# Patient Record
Sex: FEMALE | Race: WHITE | NOT HISPANIC OR LATINO
[De-identification: names, ages, dates, MRNs, and addresses within clinical notes are randomized per-mention and may not be internally consistent; named-entity substitution may affect disease eponyms.]

---

## 2017-09-12 ENCOUNTER — MESSAGE (OUTPATIENT)
Age: 57
End: 2017-09-12

## 2017-09-12 PROBLEM — Z00.00 ENCOUNTER FOR PREVENTIVE HEALTH EXAMINATION: Status: ACTIVE | Noted: 2017-09-12

## 2017-10-19 ENCOUNTER — NON-APPOINTMENT (OUTPATIENT)
Age: 57
End: 2017-10-19

## 2017-10-19 ENCOUNTER — APPOINTMENT (OUTPATIENT)
Dept: GASTROENTEROLOGY | Facility: CLINIC | Age: 57
End: 2017-10-19
Payer: COMMERCIAL

## 2017-10-19 VITALS
WEIGHT: 191 LBS | TEMPERATURE: 98.5 F | SYSTOLIC BLOOD PRESSURE: 126 MMHG | HEIGHT: 64 IN | DIASTOLIC BLOOD PRESSURE: 78 MMHG | BODY MASS INDEX: 32.61 KG/M2

## 2017-10-19 DIAGNOSIS — K44.9 DIAPHRAGMATIC HERNIA W/OUT OBSTRUCTION OR GANGRENE: ICD-10-CM

## 2017-10-19 DIAGNOSIS — Z87.891 PERSONAL HISTORY OF NICOTINE DEPENDENCE: ICD-10-CM

## 2017-10-19 DIAGNOSIS — E72.12 METHYLENETETRAHYDROFOLATE REDUCTASE DEFICIENCY: ICD-10-CM

## 2017-10-19 DIAGNOSIS — Z80.3 FAMILY HISTORY OF MALIGNANT NEOPLASM OF BREAST: ICD-10-CM

## 2017-10-19 DIAGNOSIS — Z80.0 FAMILY HISTORY OF MALIGNANT NEOPLASM OF DIGESTIVE ORGANS: ICD-10-CM

## 2017-10-19 DIAGNOSIS — Z86.018 PERSONAL HISTORY OF OTHER BENIGN NEOPLASM: ICD-10-CM

## 2017-10-19 DIAGNOSIS — Z86.2 PERSONAL HISTORY OF DISEASES OF THE BLOOD AND BLOOD-FORMING ORGANS AND CERTAIN DISORDERS INVOLVING THE IMMUNE MECHANISM: ICD-10-CM

## 2017-10-19 DIAGNOSIS — R93.5 ABNORMAL FINDINGS ON DIAGNOSTIC IMAGING OF OTHER ABDOMINAL REGIONS, INCLUDING RETROPERITONEUM: ICD-10-CM

## 2017-10-19 PROCEDURE — 93000 ELECTROCARDIOGRAM COMPLETE: CPT

## 2017-10-19 PROCEDURE — 99215 OFFICE O/P EST HI 40 MIN: CPT | Mod: 25

## 2017-10-19 RX ORDER — ASPIRIN 81 MG
81 TABLET, DELAYED RELEASE (ENTERIC COATED) ORAL
Refills: 0 | Status: ACTIVE | COMMUNITY

## 2017-10-23 LAB
APPEARANCE: CLEAR
BACTERIA UR CULT: NORMAL
BILIRUBIN URINE: NEGATIVE
BLOOD URINE: NEGATIVE
COLOR: YELLOW
GLUCOSE QUALITATIVE U: NEGATIVE MG/DL
KETONES URINE: NEGATIVE
LEUKOCYTE ESTERASE URINE: NEGATIVE
NITRITE URINE: NEGATIVE
PH URINE: 7.5
PROTEIN URINE: NEGATIVE MG/DL
SPECIFIC GRAVITY URINE: 1.02
UROBILINOGEN URINE: NEGATIVE MG/DL

## 2017-11-07 ENCOUNTER — APPOINTMENT (OUTPATIENT)
Dept: GASTROENTEROLOGY | Facility: CLINIC | Age: 57
End: 2017-11-07
Payer: COMMERCIAL

## 2017-11-07 VITALS
WEIGHT: 192 LBS | DIASTOLIC BLOOD PRESSURE: 80 MMHG | TEMPERATURE: 98.1 F | SYSTOLIC BLOOD PRESSURE: 124 MMHG | HEIGHT: 64 IN | BODY MASS INDEX: 32.78 KG/M2

## 2017-11-07 PROCEDURE — 99213 OFFICE O/P EST LOW 20 MIN: CPT

## 2017-11-30 ENCOUNTER — APPOINTMENT (OUTPATIENT)
Dept: GASTROENTEROLOGY | Facility: CLINIC | Age: 57
End: 2017-11-30
Payer: COMMERCIAL

## 2017-11-30 VITALS
HEIGHT: 64 IN | TEMPERATURE: 97.9 F | WEIGHT: 192 LBS | SYSTOLIC BLOOD PRESSURE: 118 MMHG | DIASTOLIC BLOOD PRESSURE: 72 MMHG | BODY MASS INDEX: 32.78 KG/M2

## 2017-11-30 PROCEDURE — 36415 COLL VENOUS BLD VENIPUNCTURE: CPT

## 2017-11-30 PROCEDURE — 99213 OFFICE O/P EST LOW 20 MIN: CPT | Mod: 25

## 2017-11-30 RX ORDER — GABAPENTIN 100 MG/1
100 CAPSULE ORAL
Refills: 0 | Status: DISCONTINUED | COMMUNITY
End: 2017-11-30

## 2017-11-30 RX ORDER — GENTAMICIN SULFATE 1 MG/G
0.1 CREAM TOPICAL
Qty: 30 | Refills: 0 | Status: DISCONTINUED | COMMUNITY
Start: 2017-05-05 | End: 2017-11-30

## 2017-12-04 LAB
ALBUMIN SERPL ELPH-MCNC: 4.1 G/DL
ALP BLD-CCNC: 89 U/L
ALT SERPL-CCNC: 29 U/L
ANION GAP SERPL CALC-SCNC: 17 MMOL/L
AST SERPL-CCNC: 30 U/L
BASOPHILS # BLD AUTO: 0.02 K/UL
BASOPHILS NFR BLD AUTO: 0.2 %
BILIRUB SERPL-MCNC: 0.4 MG/DL
BUN SERPL-MCNC: 15 MG/DL
CALCIUM SERPL-MCNC: 9.9 MG/DL
CHLORIDE SERPL-SCNC: 99 MMOL/L
CO2 SERPL-SCNC: 26 MMOL/L
CREAT SERPL-MCNC: 1.02 MG/DL
EOSINOPHIL # BLD AUTO: 0.2 K/UL
EOSINOPHIL NFR BLD AUTO: 2.3 %
GLIADIN IGA SER QL: 5.3 UNITS
GLIADIN IGG SER QL: 7.6 UNITS
GLIADIN PEPTIDE IGA SER-ACNC: NEGATIVE
GLIADIN PEPTIDE IGG SER-ACNC: NEGATIVE
GLUCOSE SERPL-MCNC: 115 MG/DL
HCT VFR BLD CALC: 39.9 %
HGB BLD-MCNC: 13.2 G/DL
IGA SER QL IEP: 259 MG/DL
IMM GRANULOCYTES NFR BLD AUTO: 0.1 %
LYMPHOCYTES # BLD AUTO: 3.13 K/UL
LYMPHOCYTES NFR BLD AUTO: 36.1 %
MAN DIFF?: NORMAL
MCHC RBC-ENTMCNC: 29.6 PG
MCHC RBC-ENTMCNC: 33.1 GM/DL
MCV RBC AUTO: 89.5 FL
MONOCYTES # BLD AUTO: 0.28 K/UL
MONOCYTES NFR BLD AUTO: 3.2 %
NEUTROPHILS # BLD AUTO: 5.04 K/UL
NEUTROPHILS NFR BLD AUTO: 58.1 %
PLATELET # BLD AUTO: 270 K/UL
POTASSIUM SERPL-SCNC: 4.2 MMOL/L
PROT SERPL-MCNC: 8.3 G/DL
RBC # BLD: 4.46 M/UL
RBC # FLD: 13.8 %
SODIUM SERPL-SCNC: 142 MMOL/L
TSH SERPL-ACNC: 1.25 UIU/ML
TTG IGA SER IA-ACNC: <5 UNITS
TTG IGA SER-ACNC: NEGATIVE
TTG IGG SER IA-ACNC: <5 UNITS
TTG IGG SER IA-ACNC: NEGATIVE
WBC # FLD AUTO: 8.68 K/UL

## 2017-12-13 ENCOUNTER — APPOINTMENT (OUTPATIENT)
Dept: GASTROENTEROLOGY | Facility: CLINIC | Age: 57
End: 2017-12-13
Payer: COMMERCIAL

## 2017-12-13 VITALS
HEIGHT: 64 IN | SYSTOLIC BLOOD PRESSURE: 140 MMHG | BODY MASS INDEX: 32.27 KG/M2 | TEMPERATURE: 99 F | WEIGHT: 189 LBS | DIASTOLIC BLOOD PRESSURE: 80 MMHG

## 2017-12-13 PROCEDURE — 99213 OFFICE O/P EST LOW 20 MIN: CPT | Mod: 25

## 2017-12-13 PROCEDURE — 82270 OCCULT BLOOD FECES: CPT

## 2017-12-13 RX ORDER — OMEPRAZOLE 20 MG/1
20 CAPSULE, DELAYED RELEASE ORAL DAILY
Qty: 90 | Refills: 0 | Status: DISCONTINUED | COMMUNITY
Start: 2017-09-29 | End: 2017-12-13

## 2017-12-13 RX ORDER — LORATADINE 5 MG
17 TABLET,CHEWABLE ORAL
Refills: 0 | Status: DISCONTINUED | COMMUNITY
End: 2017-12-13

## 2018-01-02 ENCOUNTER — TRANSCRIPTION ENCOUNTER (OUTPATIENT)
Age: 58
End: 2018-01-02

## 2018-01-05 ENCOUNTER — APPOINTMENT (OUTPATIENT)
Dept: GASTROENTEROLOGY | Facility: CLINIC | Age: 58
End: 2018-01-05
Payer: COMMERCIAL

## 2018-01-05 VITALS
HEIGHT: 64 IN | BODY MASS INDEX: 32.44 KG/M2 | TEMPERATURE: 97.7 F | SYSTOLIC BLOOD PRESSURE: 128 MMHG | WEIGHT: 190 LBS | DIASTOLIC BLOOD PRESSURE: 84 MMHG

## 2018-01-05 PROCEDURE — 99213 OFFICE O/P EST LOW 20 MIN: CPT

## 2018-01-05 RX ORDER — CHLORHEXIDINE GLUCONATE 4 %
400 LIQUID (ML) TOPICAL
Refills: 0 | Status: DISCONTINUED | COMMUNITY
End: 2018-01-05

## 2018-01-05 RX ORDER — LYSINE HCL 500 MG
TABLET ORAL
Refills: 0 | Status: DISCONTINUED | COMMUNITY
End: 2018-01-05

## 2018-01-05 RX ORDER — SACCHAROMYCES BOULARDII 50 MG
CAPSULE ORAL
Refills: 0 | Status: DISCONTINUED | COMMUNITY
End: 2018-01-05

## 2018-01-22 ENCOUNTER — APPOINTMENT (OUTPATIENT)
Dept: GASTROENTEROLOGY | Facility: CLINIC | Age: 58
End: 2018-01-22
Payer: COMMERCIAL

## 2018-01-22 VITALS
HEIGHT: 64 IN | TEMPERATURE: 98.3 F | SYSTOLIC BLOOD PRESSURE: 140 MMHG | BODY MASS INDEX: 31.41 KG/M2 | WEIGHT: 184 LBS | DIASTOLIC BLOOD PRESSURE: 80 MMHG

## 2018-01-22 PROCEDURE — 99213 OFFICE O/P EST LOW 20 MIN: CPT

## 2018-01-22 RX ORDER — LUBIPROSTONE 24 UG/1
24 CAPSULE, GELATIN COATED ORAL
Refills: 0 | Status: DISCONTINUED | COMMUNITY
End: 2018-01-22

## 2018-02-14 ENCOUNTER — APPOINTMENT (OUTPATIENT)
Dept: GASTROENTEROLOGY | Facility: CLINIC | Age: 58
End: 2018-02-14
Payer: COMMERCIAL

## 2018-02-14 VITALS
WEIGHT: 183 LBS | BODY MASS INDEX: 31.24 KG/M2 | DIASTOLIC BLOOD PRESSURE: 80 MMHG | SYSTOLIC BLOOD PRESSURE: 110 MMHG | TEMPERATURE: 98.8 F | HEIGHT: 64 IN

## 2018-02-14 DIAGNOSIS — K21.9 GASTRO-ESOPHAGEAL REFLUX DISEASE W/OUT ESOPHAGITIS: ICD-10-CM

## 2018-02-14 DIAGNOSIS — F45.8 OTHER SOMATOFORM DISORDERS: ICD-10-CM

## 2018-02-14 DIAGNOSIS — R10.32 LEFT LOWER QUADRANT PAIN: ICD-10-CM

## 2018-02-14 PROCEDURE — 99214 OFFICE O/P EST MOD 30 MIN: CPT

## 2018-02-14 RX ORDER — CHOLECALCIFEROL (VITAMIN D3) 25 MCG
TABLET ORAL
Refills: 0 | Status: DISCONTINUED | COMMUNITY
End: 2018-02-14

## 2018-02-14 RX ORDER — LINACLOTIDE 290 UG/1
290 CAPSULE, GELATIN COATED ORAL
Refills: 0 | Status: DISCONTINUED | COMMUNITY
End: 2018-02-14

## 2018-02-14 RX ORDER — BACILLUS COAGULANS/INULIN 1B-250 MG
CAPSULE ORAL
Refills: 0 | Status: DISCONTINUED | COMMUNITY
End: 2018-02-14

## 2019-05-10 ENCOUNTER — APPOINTMENT (OUTPATIENT)
Dept: GASTROENTEROLOGY | Facility: CLINIC | Age: 59
End: 2019-05-10
Payer: COMMERCIAL

## 2019-05-10 VITALS
OXYGEN SATURATION: 98 % | TEMPERATURE: 98.3 F | HEIGHT: 64 IN | HEART RATE: 67 BPM | WEIGHT: 177 LBS | SYSTOLIC BLOOD PRESSURE: 110 MMHG | BODY MASS INDEX: 30.22 KG/M2 | DIASTOLIC BLOOD PRESSURE: 78 MMHG

## 2019-05-10 PROCEDURE — 99214 OFFICE O/P EST MOD 30 MIN: CPT | Mod: 25

## 2019-05-10 PROCEDURE — 82270 OCCULT BLOOD FECES: CPT

## 2019-05-10 RX ORDER — OMEPRAZOLE 20 MG/1
20 CAPSULE, DELAYED RELEASE ORAL
Qty: 90 | Refills: 0 | Status: DISCONTINUED | COMMUNITY
Start: 2018-02-14 | End: 2019-05-10

## 2019-05-10 RX ORDER — HYOSCYAMINE SULFATE 0.12 MG/1
0.12 TABLET SUBLINGUAL 4 TIMES DAILY
Qty: 50 | Refills: 0 | Status: DISCONTINUED | COMMUNITY
Start: 2018-01-22 | End: 2019-05-10

## 2019-05-10 RX ORDER — LACTOBACILLUS ACIDOPHILUS/PECT 30 MG-20MG
TABLET ORAL
Refills: 0 | Status: ACTIVE | COMMUNITY

## 2019-05-10 RX ORDER — CHOLECALCIFEROL (VITAMIN D3) 25 MCG
TABLET ORAL
Refills: 0 | Status: ACTIVE | COMMUNITY

## 2019-05-10 RX ORDER — HYDROCORTISONE ACETATE 25 MG/1
25 SUPPOSITORY RECTAL
Qty: 20 | Refills: 1 | Status: ACTIVE | COMMUNITY
Start: 2019-05-10 | End: 1900-01-01

## 2019-05-11 NOTE — ASSESSMENT
[FreeTextEntry1] : Patient with complaints of rectal pressure along with pain, burning, and itching leading to a sensation that her bowel movements are incomplete. There are no masses or external hemorrhoids on rectal examination stool is guaiac negative. It is possible that the patient has symptomatic internal hemorrhoids.\par \par We will empirically treat with hydrocortisone suppositories 25 mg twice a day for 10 days.\par \par The patient will call me after these 10 days to update me on her symptoms. If there is no improvement, I will then send the patient to colorectal surgeon for further evaluation.\par \par Patient is due for colonoscopy in February 2020.\par \par \par Plan from 2/14/18 - Patient's bowel movements a doing much better without the use of any medication. She is changed her diet and is exercising. The patient does complain of belching and a globus sensation related to her known reflux.\par \par Patient was advised to continue a high fiber diet.\par \par Patient was advised to use omeprazole 20 mg a day as needed.\par \par A list of dietary and lifestyle modifications in the treatment of GERD was given to the patient.\par \par \par Plan from 1/22/18 - Patient with improved bowel movements on Linzess but still with intermittent left lower quadrant pain. The patient has a rash and there is some concern that this could be an allergic reaction to the Linzess. Patient also states that her reflux symptoms have been worse since beginning the medication.\par \par Patient was advised to stop Linzess and returned to using MiraLax 17 g a day.\par \par The patient was given hyoscyamine SL2 use p.r.n.\par \par Patient was advised she must see a dermatologist regarding the rash.\par \par Patient was advised to continue diet and weight loss for her acid reflux.\par \par \par Plan from 1/5/18 - Patient doing somewhat better on Amitiza 24 mcg b.i.d. with improvement with her bowel movements. She continues to have left lower quadrant pain.\par \par I have changed the patient to Linzess 290 mcg q.d. to see if this provides better symptom response.\par \par Patient will return to see me in 2-3 weeks.\par \par \par Plan from 12/13/17 - Patient with continued complaints of constipation and incomplete bowel movements. She had a CT scan which showed a significant amount of stool extending into the small intestine. She has soft stool present in the rectal vault on rectal exam. She denies any reflux symptoms and was able to come off the omeprazole.\par \par I've increased Amitiza 20 mcg b.i.d. Samples were given.\par \par A list of dietary and lifestyle modifications in the treatment of GERD was given to the patient.\par \par Patient will return to see me in 3-4 weeks to assess her response.\par \par \par Note from 11/30/17 - Patient with ongoing feeling of constipation despite being on MiraLax daily. She also has left lower quadrant pain. CT scans have not shown any evidence of diverticulitis. The patient self administered a course of Cipro last week without improvement. Her reflux symptoms are doing very well despite decreasing her dosage of omeprazole significantly.\par \par As the symptoms are most consistent with IBS-C, I have given the patient samples of Amitiza 8 mcg b.i.d. to take instead of the MiraLax.\par \par Bloodwork was sent for CBC, chem-pack, TSH, celiac markers. \par \par Patient will try stopping omeprazole altogether.\par \par Patient will return to see me in 2 weeks to assess her response to the above.\par \par \par Plan from 11/7/17 - Patient doing well on daily MiraLax with resolution of abdominal pain and regular bowel movements. She has no symptoms of reflux on omeprazole.\par \par Patient will continue MiraLax daily.\par \par A list of dietary and lifestyle modifications in the treatment of GERD was given to the patient.\par \par We will try decreasing omeprazole to 20 mg every other day.\par \par \par Plan from 10/19/17 - Patient went to the ER with left lower quadrant pain and was found to have a significant amount  of stool in both the small bowel and colon. CT also showed moderate thickening versus undistended distention of the antrum. She feels better after taking magnesium citrate. She had colonoscopy and EGD earlier this year.\par \par Patient was advised to start MiraLax 17 g a day.\par \par UA and C&S were sent. Patient was also advised to followup with her urologist Dr. Salazar regarding the CT findings.\par \par EKG was done for the irregular heart beat. Frequent PACs were noted. She was advised to followup with Dr. Sheth regarding this.\par \par Patient will return to see me in 2 weeks.

## 2019-05-11 NOTE — HISTORY OF PRESENT ILLNESS
[FreeTextEntry1] : The patient has a history of colonic polyps. Recently, she has a sensation of constipation with incomplete bowel movements she is moving her bowels one to 2 times a day. She has been taking MiraLax daily and Colace for greater than a month but still feels rectal pressure leading to a sensation of incomplete bowel movements. She also notes burning and itching in her rectum as well. She denies melena or prior blood per rectum. She denies abdominal pain. She has lost 18 pounds over the past 4 months intentionally. The patient has not been hospitalized in the past year and denies any cardiac issues.\par \par \par Note from 2/14/18 - The patient stopped Linzess and never went back on MiraLax. She has changed her diet and is eating more food more fruits and vegetables as well as exercising. She reports 1-2 solid bowel movements a day without melena or bright red blood per rectum. She notes much less abdominal pain. She tried hyoscyamine 2-3 times and did not notice any response. She does report that she is belching and also has a globus sensation in her throat. She has occasional mild heartburn. She denies dysphagia. The patient started taking apple side of the negative.\par \par \par Note from 1/22/18 - The patient has been on Linzess 290 mcg a day for the past 2 weeks. She is having one bowel movement a day which is soft without melena or prior blood per rectum. She continues to have left lower quadrant pain one to 2 times a week that last a few hours at a time and then resolves. Of concern, the patient has a macular rash on her face, chest, and back along with some pruritus. It is unclear whether or not this was present before the Linzess was started. The patient also states that her heartburn has returned after being placed on the Linzess. The patient has lost 6 pounds intentionally.\par \par \par Note from 1/5/18 - The patient has been on Amitiza 24 mcg b.i.d. Her bowel movements are better with 1-2 solid bowel movements a day. She continues to have left lower quadrant pain occurring 2-3 times a week. She also notes increased belching.\par \par \par Note from 12/13/17 - The patient has been on Amitiza 8 mcg b.i.d. for the past 10-12 days. She continues to complain of constipation with incomplete bowel movements. She goes mostly daily although occasionally skips a day. She has occasional left lower quadrant pain/spasm. The patient has stopped her omeprazole completely and denies heartburn, dysphagia, or abdominal pain. We reviewed her blood work from the previous visit on November 30. These results were normal.\par \par \par Note from 11/30/17 - The patient has been on MiraLax daily and has been able to decrease her omeprazole to 20 mg every 3-4 days. Despite moving her bowels once a day with a solid bowel movement, she continues to feel like she is full all and also has started to have left lower quadrant pain again. She took 7 days of Cipro 500 mg b.i.d. on her home last week but this did not help the pain. She also complains of chills but does not have fever, nausea, or vomiting. She denies melena or bright red blood per rectum. She also denies any heartburn or dysphagia on the infrequent dosing of omeprazole. Her weight is stable.\par \par \par Note from 11/7/17 - The patient has been on MiraLax q.d. and is having one to 2 solid bowel movements a day. She skipped a day on 2 occasions. Her left lower quadrant pain is resolved. Her UA and C&S was negative. She denies heartburn or dysphagia. Of note, the patient was just started on Neurontin.\par \par \par Note from 10/19/17 - The patient went to be [facility] emergency room on October 15, 2017 with left lower quadrant pain which had been going on for weeks. A CT scan showed fecalization of some small bowel loops and prominence of stool consistent with possible delayed transit. Additionally, minimal right hydronephrosis and left renal collecting system fullness was seen. There was mild wall thickening versus under distention of the gastric antrum. The patient took a 10 ounce bile and citrate of magnesia yesterday and emptied 4 times. She does feel better. She states that the pain is still there though much less and is more with sitting. There is improvement with a bowel movement. The patient reports one bowel movement a day without melena or bright red blood per rectum. She denies fever but no chills for a few weeks. Her weight is stable. She denies nausea, vomiting, heartburn, or dysphagia on omeprazole 20 mg a day. She denies dysuria or hematuria.\par \par The patient's last EGD and colonoscopy were in February. Colonoscopy had a polyp and external hemorrhoids. EGD had a 1 cm hiatal hernia.\par \par The patient has not been admitted to the hospital other than for an appendectomy in the past year. She denies any cardiac history.

## 2019-05-11 NOTE — CONSULT LETTER
[FreeTextEntry1] : Dear Dr. Ilia Sheth ,\par \par I had the pleasure of seeing your patient LEILA THOMPSON in the office today.  My office note is attached.\par \par Thank you very much for allowing me to participate in the care of your patient.\par \par Sincerely,\par \par Mehran Varner M.D., FACG, FACP\par Director, Celiac Program at [facility]\par  of Medicine\par [employer] at [facility] Practice Director,\par [facility] - Gastroenterology/Internal Medicine at [place]\[facility] [address]\[place] [address]\par Tel: [phone]\par Email: [email or web address] \par \par \par The attached note has been created using a voice recognition system (Dragon).  There may be some misspellings and typos.  Please call my office if you have any issues or questions.

## 2019-05-11 NOTE — REVIEW OF SYSTEMS
[As Noted in HPI] : as noted in HPI [Negative] : Heme/Lymph [Recent Weight Loss (___ Lbs)] : recent [unfilled] ~Ulb weight loss [FreeTextEntry3] : diplopia - seeing neuroophthomoliogist (accomodative spasm)

## 2019-05-11 NOTE — PHYSICAL EXAM
[General Appearance - In No Acute Distress] : in no acute distress [General Appearance - Alert] : alert [Neck Appearance] : the appearance of the neck was normal [Thyroid Nodule] : there were no palpable thyroid nodules [Jugular Venous Distention Increased] : there was no jugular-venous distention [Neck Cervical Mass (___cm)] : no neck mass was observed [Thyroid Diffuse Enlargement] : the thyroid was not enlarged [Heart Sounds] : normal S1 and S2 [Auscultation Breath Sounds / Voice Sounds] : lungs were clear to auscultation bilaterally [Heart Sounds Gallop] : no gallops [Murmurs] : no murmurs [Heart Sounds Pericardial Friction Rub] : no pericardial rub [Edema] : there was no peripheral edema [] : no hepato-splenomegaly [Abdomen Soft] : soft [Bowel Sounds] : normal bowel sounds [No CVA Tenderness] : no ~M costovertebral angle tenderness [Abdomen Mass (___ Cm)] : no abdominal mass palpated [Oriented To Time, Place, And Person] : oriented to person, place, and time [Impaired Insight] : insight and judgment were intact [No Spinal Tenderness] : no spinal tenderness [Affect] : the affect was normal [Normal Sphincter Tone] : normal sphincter tone [No Rectal Mass] : no rectal mass [Occult Blood Positive] : stool was negative for occult blood [FreeTextEntry1] : no external hemorrhoids, no evidence of abscess

## 2019-05-17 ENCOUNTER — TRANSCRIPTION ENCOUNTER (OUTPATIENT)
Age: 59
End: 2019-05-17

## 2019-06-14 ENCOUNTER — APPOINTMENT (OUTPATIENT)
Dept: GASTROENTEROLOGY | Facility: CLINIC | Age: 59
End: 2019-06-14
Payer: COMMERCIAL

## 2019-06-14 VITALS
BODY MASS INDEX: 30.05 KG/M2 | HEART RATE: 72 BPM | SYSTOLIC BLOOD PRESSURE: 118 MMHG | DIASTOLIC BLOOD PRESSURE: 76 MMHG | OXYGEN SATURATION: 98 % | WEIGHT: 176 LBS | TEMPERATURE: 98.5 F | HEIGHT: 64 IN

## 2019-06-14 DIAGNOSIS — K62.89 OTHER SPECIFIED DISEASES OF ANUS AND RECTUM: ICD-10-CM

## 2019-06-14 DIAGNOSIS — K59.00 CONSTIPATION, UNSPECIFIED: ICD-10-CM

## 2019-06-14 PROCEDURE — 82270 OCCULT BLOOD FECES: CPT

## 2019-06-14 PROCEDURE — 99213 OFFICE O/P EST LOW 20 MIN: CPT | Mod: 25

## 2019-06-15 PROBLEM — K62.89 RECTAL PAIN: Status: ACTIVE | Noted: 2019-05-10

## 2019-06-15 PROBLEM — K59.00 CONSTIPATION: Status: ACTIVE | Noted: 2017-10-19

## 2019-06-16 NOTE — CONSULT LETTER
[FreeTextEntry1] : Dear Dr. Ilia Sheth ,\par \par I had the pleasure of seeing your patient LEILA THOMPSON in the office today.  My office note is attached.\par \par Thank you very much for allowing me to participate in the care of your patient.\par \par Sincerely,\par \par Mehran Vanrer M.D., FACG, FACP\par Director, Celiac Program at [facility]\par  of Medicine\par [employer] at [facility] Practice Director,\par [facility] - Gastroenterology/Internal Medicine at [place]\[facility] [address]\[place] [address]\par Tel: [phone]\par Email: [email or web address] \par \par \par The attached note has been created using a voice recognition system (Dragon).  There may be some misspellings and typos.  Please call my office if you have any issues or questions.

## 2019-06-16 NOTE — HISTORY OF PRESENT ILLNESS
[FreeTextEntry1] : The patient continues to have rectal pain and is noted no improvement after 10 days of hydrocortisone suppositories. She is on MiraLax daily and is moving her bowels once to twice a day. She denies rectal bleeding and is otherwise well.\par \par \par Note from 5/10/19 - The patient has a history of colonic polyps. Recently, she has a sensation of constipation with incomplete bowel movements she is moving her bowels one to 2 times a day. She has been taking MiraLax daily and Colace for greater than a month but still feels rectal pressure leading to a sensation of incomplete bowel movements. She also notes burning and itching in her rectum as well. She denies melena or prior blood per rectum. She denies abdominal pain. She has lost 18 pounds over the past 4 months intentionally. The patient has not been hospitalized in the past year and denies any cardiac issues.\par \par \par Note from 2/14/18 - The patient stopped Linzess and never went back on MiraLax. She has changed her diet and is eating more food more fruits and vegetables as well as exercising. She reports 1-2 solid bowel movements a day without melena or bright red blood per rectum. She notes much less abdominal pain. She tried hyoscyamine 2-3 times and did not notice any response. She does report that she is belching and also has a globus sensation in her throat. She has occasional mild heartburn. She denies dysphagia. The patient started taking apple side of the negative.\par \par \par Note from 1/22/18 - The patient has been on Linzess 290 mcg a day for the past 2 weeks. She is having one bowel movement a day which is soft without melena or prior blood per rectum. She continues to have left lower quadrant pain one to 2 times a week that last a few hours at a time and then resolves. Of concern, the patient has a macular rash on her face, chest, and back along with some pruritus. It is unclear whether or not this was present before the Linzess was started. The patient also states that her heartburn has returned after being placed on the Linzess. The patient has lost 6 pounds intentionally.\par \par \par Note from 1/5/18 - The patient has been on Amitiza 24 mcg b.i.d. Her bowel movements are better with 1-2 solid bowel movements a day. She continues to have left lower quadrant pain occurring 2-3 times a week. She also notes increased belching.\par \par \par Note from 12/13/17 - The patient has been on Amitiza 8 mcg b.i.d. for the past 10-12 days. She continues to complain of constipation with incomplete bowel movements. She goes mostly daily although occasionally skips a day. She has occasional left lower quadrant pain/spasm. The patient has stopped her omeprazole completely and denies heartburn, dysphagia, or abdominal pain. We reviewed her blood work from the previous visit on November 30. These results were normal.\par \par \par Note from 11/30/17 - The patient has been on MiraLax daily and has been able to decrease her omeprazole to 20 mg every 3-4 days. Despite moving her bowels once a day with a solid bowel movement, she continues to feel like she is full all and also has started to have left lower quadrant pain again. She took 7 days of Cipro 500 mg b.i.d. on her home last week but this did not help the pain. She also complains of chills but does not have fever, nausea, or vomiting. She denies melena or bright red blood per rectum. She also denies any heartburn or dysphagia on the infrequent dosing of omeprazole. Her weight is stable.\par \par \par Note from 11/7/17 - The patient has been on MiraLax q.d. and is having one to 2 solid bowel movements a day. She skipped a day on 2 occasions. Her left lower quadrant pain is resolved. Her UA and C&S was negative. She denies heartburn or dysphagia. Of note, the patient was just started on Neurontin.\par \par \par Note from 10/19/17 - The patient went to be [place] emergency room on October 15, 2017 with left lower quadrant pain which had been going on for weeks. A CT scan showed fecalization of some small bowel loops and prominence of stool consistent with possible delayed transit. Additionally, minimal right hydronephrosis and left renal collecting system fullness was seen. There was mild wall thickening versus under distention of the gastric antrum. The patient took a 10 ounce bile and citrate of magnesia yesterday and emptied 4 times. She does feel better. She states that the pain is still there though much less and is more with sitting. There is improvement with a bowel movement. The patient reports one bowel movement a day without melena or bright red blood per rectum. She denies fever but no chills for a few weeks. Her weight is stable. She denies nausea, vomiting, heartburn, or dysphagia on omeprazole 20 mg a day. She denies dysuria or hematuria.\par \par The patient's last EGD and colonoscopy were in February. Colonoscopy had a polyp and external hemorrhoids. EGD had a 1 cm hiatal hernia.\par \par The patient has not been admitted to the hospital other than for an appendectomy in the past year. She denies any cardiac history.

## 2019-06-16 NOTE — ASSESSMENT
[FreeTextEntry1] : Patient with ongoing complaints of rectal pain and pressure which did not respond to necrosis of hydrocortisone suppositories. Rectal exam does not reveal any masses or obvious hemorrhoids and stool is guaiac-negative.\par \par Patient already has an appointment with the colorectal surgeon next week. I advised her to keep this as she may have a fissure or symptomatic internal hemorrhoids.\par \par Patient is due for colonoscopy in February 2020 for her history of colonic polyps.\par \par \par Plan from 5/10/19 - Patient with complaints of rectal pressure along with pain, burning, and itching leading to a sensation that her bowel movements are incomplete. There are no masses or external hemorrhoids on rectal examination stool is guaiac negative. It is possible that the patient has symptomatic internal hemorrhoids.\par \par We will empirically treat with hydrocortisone suppositories 25 mg twice a day for 10 days.\par \par The patient will call me after these 10 days to update me on her symptoms. If there is no improvement, I will then send the patient to colorectal surgeon for further evaluation.\par \par Patient is due for colonoscopy in February 2020.\par \par \par Plan from 2/14/18 - Patient's bowel movements a doing much better without the use of any medication. She is changed her diet and is exercising. The patient does complain of belching and a globus sensation related to her known reflux.\par \par Patient was advised to continue a high fiber diet.\par \par Patient was advised to use omeprazole 20 mg a day as needed.\par \par A list of dietary and lifestyle modifications in the treatment of GERD was given to the patient.\par \par \par Plan from 1/22/18 - Patient with improved bowel movements on Linzess but still with intermittent left lower quadrant pain. The patient has a rash and there is some concern that this could be an allergic reaction to the Linzess. Patient also states that her reflux symptoms have been worse since beginning the medication.\par \par Patient was advised to stop Linzess and returned to using MiraLax 17 g a day.\par \par The patient was given hyoscyamine SL2 use p.r.n.\par \par Patient was advised she must see a dermatologist regarding the rash.\par \par Patient was advised to continue diet and weight loss for her acid reflux.\par \par \par Plan from 1/5/18 - Patient doing somewhat better on Amitiza 24 mcg b.i.d. with improvement with her bowel movements. She continues to have left lower quadrant pain.\par \par I have changed the patient to Linzess 290 mcg q.d. to see if this provides better symptom response.\par \par Patient will return to see me in 2-3 weeks.\par \par \par Plan from 12/13/17 - Patient with continued complaints of constipation and incomplete bowel movements. She had a CT scan which showed a significant amount of stool extending into the small intestine. She has soft stool present in the rectal vault on rectal exam. She denies any reflux symptoms and was able to come off the omeprazole.\par \par I've increased Amitiza 20 mcg b.i.d. Samples were given.\par \par A list of dietary and lifestyle modifications in the treatment of GERD was given to the patient.\par \par Patient will return to see me in 3-4 weeks to assess her response.\par \par \par Note from 11/30/17 - Patient with ongoing feeling of constipation despite being on MiraLax daily. She also has left lower quadrant pain. CT scans have not shown any evidence of diverticulitis. The patient self administered a course of Cipro last week without improvement. Her reflux symptoms are doing very well despite decreasing her dosage of omeprazole significantly.\par \par As the symptoms are most consistent with IBS-C, I have given the patient samples of Amitiza 8 mcg b.i.d. to take instead of the MiraLax.\par \par Bloodwork was sent for CBC, chem-pack, TSH, celiac markers. \par \par Patient will try stopping omeprazole altogether.\par \par Patient will return to see me in 2 weeks to assess her response to the above.\par \par \par Plan from 11/7/17 - Patient doing well on daily MiraLax with resolution of abdominal pain and regular bowel movements. She has no symptoms of reflux on omeprazole.\par \par Patient will continue MiraLax daily.\par \par A list of dietary and lifestyle modifications in the treatment of GERD was given to the patient.\par \par We will try decreasing omeprazole to 20 mg every other day.\par \par \par Plan from 10/19/17 - Patient went to the ER with left lower quadrant pain and was found to have a significant amount  of stool in both the small bowel and colon. CT also showed moderate thickening versus undistended distention of the antrum. She feels better after taking magnesium citrate. She had colonoscopy and EGD earlier this year.\par \par Patient was advised to start MiraLax 17 g a day.\par \par UA and C&S were sent. Patient was also advised to followup with her urologist Dr. Salazar regarding the CT findings.\par \par EKG was done for the irregular heart beat. Frequent PACs were noted. She was advised to followup with Dr. Sheth regarding this.\par \par Patient will return to see me in 2 weeks.

## 2019-06-16 NOTE — REVIEW OF SYSTEMS
[Recent Weight Loss (___ Lbs)] : recent [unfilled] ~Ulb weight loss [As Noted in HPI] : as noted in HPI [Negative] : Psychiatric [FreeTextEntry3] : diplopia - seeing neuroophthomoliogist (accomodative spasm)

## 2019-06-16 NOTE — PHYSICAL EXAM
[General Appearance - Alert] : alert [General Appearance - In No Acute Distress] : in no acute distress [Jugular Venous Distention Increased] : there was no jugular-venous distention [Neck Cervical Mass (___cm)] : no neck mass was observed [Neck Appearance] : the appearance of the neck was normal [Thyroid Diffuse Enlargement] : the thyroid was not enlarged [Thyroid Nodule] : there were no palpable thyroid nodules [Auscultation Breath Sounds / Voice Sounds] : lungs were clear to auscultation bilaterally [Heart Sounds] : normal S1 and S2 [Heart Sounds Gallop] : no gallops [Heart Sounds Pericardial Friction Rub] : no pericardial rub [Murmurs] : no murmurs [Edema] : there was no peripheral edema [Bowel Sounds] : normal bowel sounds [Abdomen Mass (___ Cm)] : no abdominal mass palpated [Abdomen Soft] : soft [] : no hepato-splenomegaly [No Spinal Tenderness] : no spinal tenderness [No CVA Tenderness] : no ~M costovertebral angle tenderness [Impaired Insight] : insight and judgment were intact [Affect] : the affect was normal [Oriented To Time, Place, And Person] : oriented to person, place, and time [No Rectal Mass] : no rectal mass [Normal Sphincter Tone] : normal sphincter tone [Occult Blood Positive] : stool was negative for occult blood [FreeTextEntry1] :  LLQ tenderness without rebound/guarding over LLQ scar

## 2019-08-05 PROBLEM — R10.32 LEFT LOWER QUADRANT PAIN: Status: ACTIVE | Noted: 2017-10-19

## 2019-11-06 ENCOUNTER — RESULT REVIEW (OUTPATIENT)
Age: 59
End: 2019-11-06

## 2020-05-15 ENCOUNTER — APPOINTMENT (OUTPATIENT)
Dept: GASTROENTEROLOGY | Facility: CLINIC | Age: 60
End: 2020-05-15
Payer: COMMERCIAL

## 2020-05-15 DIAGNOSIS — Z86.000 PERSONAL HISTORY OF IN-SITU NEOPLASM OF BREAST: ICD-10-CM

## 2020-05-15 PROCEDURE — 99213 OFFICE O/P EST LOW 20 MIN: CPT | Mod: 95

## 2020-05-15 RX ORDER — PECTIN/VIT B6/MINS 4/C.VINEGAR 8.3-300MG
TABLET ORAL
Refills: 0 | Status: DISCONTINUED | COMMUNITY
End: 2020-05-15

## 2020-05-15 RX ORDER — LORATADINE 5 MG
17 TABLET,CHEWABLE ORAL
Refills: 0 | Status: DISCONTINUED | COMMUNITY
End: 2020-05-15

## 2020-05-15 RX ORDER — DOCUSATE SODIUM 100 MG/1
100 CAPSULE ORAL
Refills: 0 | Status: DISCONTINUED | COMMUNITY
End: 2020-05-15

## 2020-05-17 NOTE — REVIEW OF SYSTEMS
[Recent Weight Loss (___ Lbs)] : recent [unfilled] ~Ulb weight loss [As Noted in HPI] : as noted in HPI [Negative] : Heme/Lymph [FreeTextEntry3] : diplopia - seeing neuroophthomoliogist (accomodative spasm)

## 2020-05-17 NOTE — CONSULT LETTER
[FreeTextEntry1] : Dear Dr. Ilia Sheth ,\[facility] \[facility] I had the pleasure of seeing your patient LEILA THOMPSON in the office today.  My office note is attached. PLEASE READ THE "ASSESSMENT" SECTION OF THE NOTE TO SEE MY IMPRESSION AND PLAN.\par \[facility] Thank you very much for allowing me to participate in the care of your patient.\[facility] \[facility] Sincerely,\[facility] \[facility] Mehran Varner M.D., FAC, [facility] Director, Celiac Program at [facility]  of Medicine\[facility] and [facility] [employer] [facility] at [facility] Practice Director,[facility] - Gastroenterology/Internal Medicine at [address] - [address]\[place], [place] [address] Tel: [phone]\[facility] Email: [email or web address] \[facility] \[facility] The attached note has been created using a voice recognition system (Dragon).  There may be some misspellings and typos.  Please call my office if you have any issues or questions.

## 2020-05-17 NOTE — ASSESSMENT
[FreeTextEntry1] : Patient with a history of colonic polyps.\par \par Once the COVID-19 pandemic allows, a colonoscopy will be scheduled. The risks, benefits, alternatives, and limitations of the procedure, including the possibility of missed lesions, were explained.  We will wait until the patient's radiation is over and hopefully perform the colonoscopy over the summer.\par \par \par Plan from 6/14/2019 - Patient with ongoing complaints of rectal pain and pressure which did not respond to necrosis of hydrocortisone suppositories. Rectal exam does not reveal any masses or obvious hemorrhoids and stool is guaiac-negative.\par \par Patient already has an appointment with the colorectal surgeon next week. I advised her to keep this as she may have a fissure or symptomatic internal hemorrhoids.\par \par Patient is due for colonoscopy in February 2020 for her history of colonic polyps.\par \par \par Plan from 5/10/19 - Patient with complaints of rectal pressure along with pain, burning, and itching leading to a sensation that her bowel movements are incomplete. There are no masses or external hemorrhoids on rectal examination stool is guaiac negative. It is possible that the patient has symptomatic internal hemorrhoids.\par \par We will empirically treat with hydrocortisone suppositories 25 mg twice a day for 10 days.\par \par The patient will call me after these 10 days to update me on her symptoms. If there is no improvement, I will then send the patient to colorectal surgeon for further evaluation.\par \par Patient is due for colonoscopy in February 2020.\par \par \par Plan from 2/14/18 - Patient's bowel movements a doing much better without the use of any medication. She is changed her diet and is exercising. The patient does complain of belching and a globus sensation related to her known reflux.\par \par Patient was advised to continue a high fiber diet.\par \par Patient was advised to use omeprazole 20 mg a day as needed.\par \par A list of dietary and lifestyle modifications in the treatment of GERD was given to the patient.\par \par \par Plan from 1/22/18 - Patient with improved bowel movements on Linzess but still with intermittent left lower quadrant pain. The patient has a rash and there is some concern that this could be an allergic reaction to the Linzess. Patient also states that her reflux symptoms have been worse since beginning the medication.\par \par Patient was advised to stop Linzess and returned to using MiraLax 17 g a day.\par \par The patient was given hyoscyamine SL2 use p.r.n.\par \par Patient was advised she must see a dermatologist regarding the rash.\par \par Patient was advised to continue diet and weight loss for her acid reflux.\par \par \par Plan from 1/5/18 - Patient doing somewhat better on Amitiza 24 mcg b.i.d. with improvement with her bowel movements. She continues to have left lower quadrant pain.\par \par I have changed the patient to Linzess 290 mcg q.d. to see if this provides better symptom response.\par \par Patient will return to see me in 2-3 weeks.\par \par \par Plan from 12/13/17 - Patient with continued complaints of constipation and incomplete bowel movements. She had a CT scan which showed a significant amount of stool extending into the small intestine. She has soft stool present in the rectal vault on rectal exam. She denies any reflux symptoms and was able to come off the omeprazole.\par \par I've increased Amitiza 20 mcg b.i.d. Samples were given.\par \par A list of dietary and lifestyle modifications in the treatment of GERD was given to the patient.\par \par Patient will return to see me in 3-4 weeks to assess her response.\par \par \par Note from 11/30/17 - Patient with ongoing feeling of constipation despite being on MiraLax daily. She also has left lower quadrant pain. CT scans have not shown any evidence of diverticulitis. The patient self administered a course of Cipro last week without improvement. Her reflux symptoms are doing very well despite decreasing her dosage of omeprazole significantly.\par \par As the symptoms are most consistent with IBS-C, I have given the patient samples of Amitiza 8 mcg b.i.d. to take instead of the MiraLax.\par \par Bloodwork was sent for CBC, chem-pack, TSH, celiac markers. \par \par Patient will try stopping omeprazole altogether.\par \par Patient will return to see me in 2 weeks to assess her response to the above.\par \par \par Plan from 11/7/17 - Patient doing well on daily MiraLax with resolution of abdominal pain and regular bowel movements. She has no symptoms of reflux on omeprazole.\par \par Patient will continue MiraLax daily.\par \par A list of dietary and lifestyle modifications in the treatment of GERD was given to the patient.\par \par We will try decreasing omeprazole to 20 mg every other day.\par \par \par Plan from 10/19/17 - Patient went to the ER with left lower quadrant pain and was found to have a significant amount  of stool in both the small bowel and colon. CT also showed moderate thickening versus undistended distention of the antrum. She feels better after taking magnesium citrate. She had colonoscopy and EGD earlier this year.\par \par Patient was advised to start MiraLax 17 g a day.\par \par UA and C&S were sent. Patient was also advised to followup with her urologist Dr. Salazar regarding the CT findings.\par \par EKG was done for the irregular heart beat. Frequent PACs were noted. She was advised to followup with Dr. Sheth regarding this.\par \par Patient will return to see me in 2 weeks.

## 2020-05-17 NOTE — HISTORY OF PRESENT ILLNESS
[Home] : at home, [unfilled] , at the time of the visit. [Medical Office: ([facility])___] : at the medical office located in  [Patient] : the patient [FreeTextEntry1] : The patient has a history of adenomatous colonic polyps.  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  She is to begin radiation treatment on Monday.  She feels well denying heartburn, dysphasia, abdominal pain.  She is no longer taking MiraLAX and is having 1 bowel movement a day.  She has seen blood in the past but not for several months.  She denies melena.  The patient is gained weight.  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  The patient has not been admitted to the hospital in the past year and denies any cardiac issues.\par \par \par Note from 6/14/2019 - The patient continues to have rectal pain and is noted no improvement after 10 days of hydrocortisone suppositories. She is on MiraLax daily and is moving her bowels once to twice a day. She denies rectal bleeding and is otherwise well.\par \par \par Note from 5/10/19 - The patient has a history of colonic polyps. Recently, she has a sensation of constipation with incomplete bowel movements she is moving her bowels one to 2 times a day. She has been taking MiraLax daily and Colace for greater than a month but still feels rectal pressure leading to a sensation of incomplete bowel movements. She also notes burning and itching in her rectum as well. She denies melena or prior blood per rectum. She denies abdominal pain. She has lost 18 pounds over the past 4 months intentionally. The patient has not been hospitalized in the past year and denies any cardiac issues.\par \par \par Note from 2/14/18 - The patient stopped Linzess and never went back on MiraLax. She has changed her diet and is eating more food more fruits and vegetables as well as exercising. She reports 1-2 solid bowel movements a day without melena or bright red blood per rectum. She notes much less abdominal pain. She tried hyoscyamine 2-3 times and did not notice any response. She does report that she is belching and also has a globus sensation in her throat. She has occasional mild heartburn. She denies dysphagia. The patient started taking apple side of the negative.\par \par \par Note from 1/22/18 - The patient has been on Linzess 290 mcg a day for the past 2 weeks. She is having one bowel movement a day which is soft without melena or prior blood per rectum. She continues to have left lower quadrant pain one to 2 times a week that last a few hours at a time and then resolves. Of concern, the patient has a macular rash on her face, chest, and back along with some pruritus. It is unclear whether or not this was present before the Linzess was started. The patient also states that her heartburn has returned after being placed on the Linzess. The patient has lost 6 pounds intentionally.\par \par \par Note from 1/5/18 - The patient has been on Amitiza 24 mcg b.i.d. Her bowel movements are better with 1-2 solid bowel movements a day. She continues to have left lower quadrant pain occurring 2-3 times a week. She also notes increased belching.\par \par \par Note from 12/13/17 - The patient has been on Amitiza 8 mcg b.i.d. for the past 10-12 days. She continues to complain of constipation with incomplete bowel movements. She goes mostly daily although occasionally skips a day. She has occasional left lower quadrant pain/spasm. The patient has stopped her omeprazole completely and denies heartburn, dysphagia, or abdominal pain. We reviewed her blood work from the previous visit on November 30. These results were normal.\par \par \par Note from 11/30/17 - The patient has been on MiraLax daily and has been able to decrease her omeprazole to 20 mg every 3-4 days. Despite moving her bowels once a day with a solid bowel movement, she continues to feel like she is full all and also has started to have left lower quadrant pain again. She took 7 days of Cipro 500 mg b.i.d. on her home last week but this did not help the pain. She also complains of chills but does not have fever, nausea, or vomiting. She denies melena or bright red blood per rectum. She also denies any heartburn or dysphagia on the infrequent dosing of omeprazole. Her weight is stable.\par \par \par Note from 11/7/17 - The patient has been on MiraLax q.d. and is having one to 2 solid bowel movements a day. She skipped a day on 2 occasions. Her left lower quadrant pain is resolved. Her UA and C&S was negative. She denies heartburn or dysphagia. Of note, the patient was just started on Neurontin.\par \par \par Note from 10/19/17 - The patient went to be [facility] emergency room on October 15, 2017 with left lower quadrant pain which had been going on for weeks. A CT scan showed fecalization of some small bowel loops and prominence of stool consistent with possible delayed transit. Additionally, minimal right hydronephrosis and left renal collecting system fullness was seen. There was mild wall thickening versus under distention of the gastric antrum. The patient took a 10 ounce bile and citrate of magnesia yesterday and emptied 4 times. She does feel better. She states that the pain is still there though much less and is more with sitting. There is improvement with a bowel movement. The patient reports one bowel movement a day without melena or bright red blood per rectum. She denies fever but no chills for a few weeks. Her weight is stable. She denies nausea, vomiting, heartburn, or dysphagia on omeprazole 20 mg a day. She denies dysuria or hematuria.\par \par The patient's last EGD and colonoscopy were in February. Colonoscopy had a polyp and external hemorrhoids. EGD had a 1 cm hiatal hernia.\par \par The patient has not been admitted to the hospital other than for an appendectomy in the past year. She denies any cardiac history.

## 2020-08-17 ENCOUNTER — APPOINTMENT (OUTPATIENT)
Dept: GASTROENTEROLOGY | Facility: CLINIC | Age: 60
End: 2020-08-17
Payer: COMMERCIAL

## 2020-08-17 VITALS
TEMPERATURE: 98.2 F | OXYGEN SATURATION: 98 % | HEIGHT: 64 IN | HEART RATE: 60 BPM | DIASTOLIC BLOOD PRESSURE: 80 MMHG | SYSTOLIC BLOOD PRESSURE: 120 MMHG | WEIGHT: 177 LBS | BODY MASS INDEX: 30.22 KG/M2

## 2020-08-17 DIAGNOSIS — R19.5 OTHER FECAL ABNORMALITIES: ICD-10-CM

## 2020-08-17 DIAGNOSIS — Z01.818 ENCOUNTER FOR OTHER PREPROCEDURAL EXAMINATION: ICD-10-CM

## 2020-08-17 DIAGNOSIS — L29.0 PRURITUS ANI: ICD-10-CM

## 2020-08-17 DIAGNOSIS — Z11.59 ENCOUNTER FOR SCREENING FOR OTHER VIRAL DISEASES: ICD-10-CM

## 2020-08-17 PROCEDURE — 99213 OFFICE O/P EST LOW 20 MIN: CPT

## 2020-08-17 NOTE — HISTORY OF PRESENT ILLNESS
[FreeTextEntry1] : The patient has a history of adenomatous colonic polyps.  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  The past 1-1/2 weeks, the patient has had anal itching.  She tried Desitin and then used hydrocortisone suppositories twice daily for 3 days with some improvement.  She is concerned because she feels that she may have seen worms in her stool.  She brought in a sample which could be vegetable matter but possibly could be a worm.  The patient has been having 1-2 solid bowel movements a day.  She notes mild lower abdominal cramping.\par \par \par Note from 5/15/2020 - The patient has a history of adenomatous colonic polyps.  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  She is to begin radiation treatment on Monday.  She feels well denying heartburn, dysphasia, abdominal pain.  She is no longer taking MiraLAX and is having 1 bowel movement a day.  She has seen blood in the past but not for several months.  She denies melena.  The patient is gained weight.  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  The patient has not been admitted to the hospital in the past year and denies any cardiac issues.\par \par \par Note from 6/14/2019 - The patient continues to have rectal pain and is noted no improvement after 10 days of hydrocortisone suppositories. She is on MiraLax daily and is moving her bowels once to twice a day. She denies rectal bleeding and is otherwise well.\par \par \par Note from 5/10/19 - The patient has a history of colonic polyps. Recently, she has a sensation of constipation with incomplete bowel movements she is moving her bowels one to 2 times a day. She has been taking MiraLax daily and Colace for greater than a month but still feels rectal pressure leading to a sensation of incomplete bowel movements. She also notes burning and itching in her rectum as well. She denies melena or prior blood per rectum. She denies abdominal pain. She has lost 18 pounds over the past 4 months intentionally. The patient has not been hospitalized in the past year and denies any cardiac issues.\par \par \par Note from 2/14/18 - The patient stopped Linzess and never went back on MiraLax. She has changed her diet and is eating more food more fruits and vegetables as well as exercising. She reports 1-2 solid bowel movements a day without melena or bright red blood per rectum. She notes much less abdominal pain. She tried hyoscyamine 2-3 times and did not notice any response. She does report that she is belching and also has a globus sensation in her throat. She has occasional mild heartburn. She denies dysphagia. The patient started taking apple side of the negative.\par \par \par Note from 1/22/18 - The patient has been on Linzess 290 mcg a day for the past 2 weeks. She is having one bowel movement a day which is soft without melena or prior blood per rectum. She continues to have left lower quadrant pain one to 2 times a week that last a few hours at a time and then resolves. Of concern, the patient has a macular rash on her face, chest, and back along with some pruritus. It is unclear whether or not this was present before the Linzess was started. The patient also states that her heartburn has returned after being placed on the Linzess. The patient has lost 6 pounds intentionally.\par \par \par Note from 1/5/18 - The patient has been on Amitiza 24 mcg b.i.d. Her bowel movements are better with 1-2 solid bowel movements a day. She continues to have left lower quadrant pain occurring 2-3 times a week. She also notes increased belching.\par \par \par Note from 12/13/17 - The patient has been on Amitiza 8 mcg b.i.d. for the past 10-12 days. She continues to complain of constipation with incomplete bowel movements. She goes mostly daily although occasionally skips a day. She has occasional left lower quadrant pain/spasm. The patient has stopped her omeprazole completely and denies heartburn, dysphagia, or abdominal pain. We reviewed her blood work from the previous visit on November 30. These results were normal.\par \par \par Note from 11/30/17 - The patient has been on MiraLax daily and has been able to decrease her omeprazole to 20 mg every 3-4 days. Despite moving her bowels once a day with a solid bowel movement, she continues to feel like she is full all and also has started to have left lower quadrant pain again. She took 7 days of Cipro 500 mg b.i.d. on her home last week but this did not help the pain. She also complains of chills but does not have fever, nausea, or vomiting. She denies melena or bright red blood per rectum. She also denies any heartburn or dysphagia on the infrequent dosing of omeprazole. Her weight is stable.\par \par \par Note from 11/7/17 - The patient has been on MiraLax q.d. and is having one to 2 solid bowel movements a day. She skipped a day on 2 occasions. Her left lower quadrant pain is resolved. Her UA and C&S was negative. She denies heartburn or dysphagia. Of note, the patient was just started on Neurontin.\par \par \par Note from 10/19/17 - The patient went to be [facility] emergency room on October 15, 2017 with left lower quadrant pain which had been going on for weeks. A CT scan showed fecalization of some small bowel loops and prominence of stool consistent with possible delayed transit. Additionally, minimal right hydronephrosis and left renal collecting system fullness was seen. There was mild wall thickening versus under distention of the gastric antrum. The patient took a 10 ounce bile and citrate of magnesia yesterday and emptied 4 times. She does feel better. She states that the pain is still there though much less and is more with sitting. There is improvement with a bowel movement. The patient reports one bowel movement a day without melena or bright red blood per rectum. She denies fever but no chills for a few weeks. Her weight is stable. She denies nausea, vomiting, heartburn, or dysphagia on omeprazole 20 mg a day. She denies dysuria or hematuria.\par \par The patient's last EGD and colonoscopy were in February. Colonoscopy had a polyp and external hemorrhoids. EGD had a 1 cm hiatal hernia.\par \par The patient has not been admitted to the hospital other than for an appendectomy in the past year. She denies any cardiac history.

## 2020-08-17 NOTE — PHYSICAL EXAM
[General Appearance - Alert] : alert [General Appearance - In No Acute Distress] : in no acute distress [Neck Appearance] : the appearance of the neck was normal [Neck Cervical Mass (___cm)] : no neck mass was observed [Jugular Venous Distention Increased] : there was no jugular-venous distention [Thyroid Diffuse Enlargement] : the thyroid was not enlarged [Thyroid Nodule] : there were no palpable thyroid nodules [Auscultation Breath Sounds / Voice Sounds] : lungs were clear to auscultation bilaterally [Heart Rate And Rhythm] : heart rate was normal and rhythm regular [Heart Sounds Gallop] : no gallops [Heart Sounds] : normal S1 and S2 [Murmurs] : no murmurs [Edema] : there was no peripheral edema [Bowel Sounds] : normal bowel sounds [Heart Sounds Pericardial Friction Rub] : no pericardial rub [Abdomen Soft] : soft [Abdomen Tenderness] : non-tender [No CVA Tenderness] : no ~M costovertebral angle tenderness [] : no hepato-splenomegaly [Abdomen Mass (___ Cm)] : no abdominal mass palpated [Oriented To Time, Place, And Person] : oriented to person, place, and time [No Spinal Tenderness] : no spinal tenderness [Impaired Insight] : insight and judgment were intact [Affect] : the affect was normal

## 2020-08-17 NOTE — ASSESSMENT
[FreeTextEntry1] : Patient with recent anal itching possible worms in her stool although I believe it is more likely vegetable matter.  The itching appears to be responding to hydrocortisone suppositories.  The patient has a history of adenomatous colonic polyps.\par \par The sample that the patient brought in was sent for O&P.  Additionally, we will send a fresh sample tomorrow for infectious PCR testing and O&P.\par \par The patient will schedule the previously recommended colonoscopy.\par \par \par Plan from 5/15/2020 - Patient with a history of colonic polyps.\par \par Once the COVID-19 pandemic allows, a colonoscopy will be scheduled. The risks, benefits, alternatives, and limitations of the procedure, including the possibility of missed lesions, were explained.  We will wait until the patient's radiation is over and hopefully perform the colonoscopy over the summer.\par \par \par Plan from 6/14/2019 - Patient with ongoing complaints of rectal pain and pressure which did not respond to necrosis of hydrocortisone suppositories. Rectal exam does not reveal any masses or obvious hemorrhoids and stool is guaiac-negative.\par \par Patient already has an appointment with the colorectal surgeon next week. I advised her to keep this as she may have a fissure or symptomatic internal hemorrhoids.\par \par Patient is due for colonoscopy in February 2020 for her history of colonic polyps.\par \par \par Plan from 5/10/19 - Patient with complaints of rectal pressure along with pain, burning, and itching leading to a sensation that her bowel movements are incomplete. There are no masses or external hemorrhoids on rectal examination stool is guaiac negative. It is possible that the patient has symptomatic internal hemorrhoids.\par \par We will empirically treat with hydrocortisone suppositories 25 mg twice a day for 10 days.\par \par The patient will call me after these 10 days to update me on her symptoms. If there is no improvement, I will then send the patient to colorectal surgeon for further evaluation.\par \par Patient is due for colonoscopy in February 2020.\par \par \par Plan from 2/14/18 - Patient's bowel movements a doing much better without the use of any medication. She is changed her diet and is exercising. The patient does complain of belching and a globus sensation related to her known reflux.\par \par Patient was advised to continue a high fiber diet.\par \par Patient was advised to use omeprazole 20 mg a day as needed.\par \par A list of dietary and lifestyle modifications in the treatment of GERD was given to the patient.\par \par \par Plan from 1/22/18 - Patient with improved bowel movements on Linzess but still with intermittent left lower quadrant pain. The patient has a rash and there is some concern that this could be an allergic reaction to the Linzess. Patient also states that her reflux symptoms have been worse since beginning the medication.\par \par Patient was advised to stop Linzess and returned to using MiraLax 17 g a day.\par \par The patient was given hyoscyamine SL2 use p.r.n.\par \par Patient was advised she must see a dermatologist regarding the rash.\par \par Patient was advised to continue diet and weight loss for her acid reflux.\par \par \par Plan from 1/5/18 - Patient doing somewhat better on Amitiza 24 mcg b.i.d. with improvement with her bowel movements. She continues to have left lower quadrant pain.\par \par I have changed the patient to Linzess 290 mcg q.d. to see if this provides better symptom response.\par \par Patient will return to see me in 2-3 weeks.\par \par \par Plan from 12/13/17 - Patient with continued complaints of constipation and incomplete bowel movements. She had a CT scan which showed a significant amount of stool extending into the small intestine. She has soft stool present in the rectal vault on rectal exam. She denies any reflux symptoms and was able to come off the omeprazole.\par \par I've increased Amitiza 20 mcg b.i.d. Samples were given.\par \par A list of dietary and lifestyle modifications in the treatment of GERD was given to the patient.\par \par Patient will return to see me in 3-4 weeks to assess her response.\par \par \par Note from 11/30/17 - Patient with ongoing feeling of constipation despite being on MiraLax daily. She also has left lower quadrant pain. CT scans have not shown any evidence of diverticulitis. The patient self administered a course of Cipro last week without improvement. Her reflux symptoms are doing very well despite decreasing her dosage of omeprazole significantly.\par \par As the symptoms are most consistent with IBS-C, I have given the patient samples of Amitiza 8 mcg b.i.d. to take instead of the MiraLax.\par \par Bloodwork was sent for CBC, chem-pack, TSH, celiac markers. \par \par Patient will try stopping omeprazole altogether.\par \par Patient will return to see me in 2 weeks to assess her response to the above.\par \par \par Plan from 11/7/17 - Patient doing well on daily MiraLax with resolution of abdominal pain and regular bowel movements. She has no symptoms of reflux on omeprazole.\par \par Patient will continue MiraLax daily.\par \par A list of dietary and lifestyle modifications in the treatment of GERD was given to the patient.\par \par We will try decreasing omeprazole to 20 mg every other day.\par \par \par Plan from 10/19/17 - Patient went to the ER with left lower quadrant pain and was found to have a significant amount  of stool in both the small bowel and colon. CT also showed moderate thickening versus undistended distention of the antrum. She feels better after taking magnesium citrate. She had colonoscopy and EGD earlier this year.\par \par Patient was advised to start MiraLax 17 g a day.\par \par UA and C&S were sent. Patient was also advised to followup with her urologist Dr. Salazar regarding the CT findings.\par \par EKG was done for the irregular heart beat. Frequent PACs were noted. She was advised to followup with Dr. Sheth regarding this.\par \par Patient will return to see me in 2 weeks.

## 2020-08-17 NOTE — CONSULT LETTER
[FreeTextEntry1] : Dear Dr. Ilia Sheth ,\[facility] \[facility] I had the pleasure of seeing your patient LEILA THOMPSON in the office today.  My office note is attached. PLEASE READ THE "ASSESSMENT" SECTION OF THE NOTE TO SEE MY IMPRESSION AND PLAN.\par \[facility] Thank you very much for allowing me to participate in the care of your patient.\[facility] \[facility] Sincerely,\[facility] \[facility] Mehran Varner M.D., FAC, [facility] Director, Celiac Program at [facility]  of Medicine\[facility] [employer] [facility] at [facility] Practice Director,[facility] - Gastroenterology/Internal Medicine at [address] - [address]\[place], [place] [address] Tel: [phone]\[facility] Email: [email or web address] \[facility] \[facility] The attached note has been created using a voice recognition system (Dragon).  There may be some misspellings and typos.  Please call my office if you have any issues or questions.

## 2020-08-19 LAB
DEPRECATED O AND P PREP STL: NORMAL
GI PCR PANEL, STOOL: NORMAL

## 2020-08-20 LAB — DEPRECATED O AND P PREP STL: NORMAL

## 2020-08-26 LAB — SARS-COV-2 N GENE NPH QL NAA+PROBE: NOT DETECTED

## 2020-08-28 ENCOUNTER — APPOINTMENT (OUTPATIENT)
Dept: GASTROENTEROLOGY | Facility: AMBULATORY MEDICAL SERVICES | Age: 60
End: 2020-08-28
Payer: COMMERCIAL

## 2020-08-28 PROCEDURE — G0105: CPT

## 2020-12-07 ENCOUNTER — APPOINTMENT (OUTPATIENT)
Dept: GASTROENTEROLOGY | Facility: CLINIC | Age: 60
End: 2020-12-07
Payer: COMMERCIAL

## 2020-12-07 VITALS
HEART RATE: 78 BPM | DIASTOLIC BLOOD PRESSURE: 80 MMHG | TEMPERATURE: 97.8 F | BODY MASS INDEX: 29.88 KG/M2 | HEIGHT: 64 IN | SYSTOLIC BLOOD PRESSURE: 110 MMHG | WEIGHT: 175 LBS | OXYGEN SATURATION: 98 %

## 2020-12-07 DIAGNOSIS — K64.4 RESIDUAL HEMORRHOIDAL SKIN TAGS: ICD-10-CM

## 2020-12-07 PROCEDURE — 99072 ADDL SUPL MATRL&STAF TM PHE: CPT

## 2020-12-07 PROCEDURE — 99213 OFFICE O/P EST LOW 20 MIN: CPT

## 2020-12-07 RX ORDER — SODIUM PICOSULFATE, MAGNESIUM OXIDE, AND ANHYDROUS CITRIC ACID 10; 3.5; 12 MG/160ML; G/160ML; G/160ML
10-3.5-12 MG-GM LIQUID ORAL
Qty: 1 | Refills: 0 | Status: DISCONTINUED | COMMUNITY
Start: 2020-05-15 | End: 2020-12-07

## 2020-12-08 NOTE — CONSULT LETTER
[FreeTextEntry1] : Dear Dr. Ilia Sheth ,\[facility] \[facility] I had the pleasure of seeing your patient LEILA THOMPSON in the office today.  My office note is attached. PLEASE READ THE "ASSESSMENT" SECTION OF THE NOTE TO SEE MY IMPRESSION AND PLAN.\par \[facility] Thank you very much for allowing me to participate in the care of your patient.\[facility] \[facility] Sincerely,\[facility] \[facility] Mehran Varner M.D., FAC, [facility] Director, Celiac Program at [facility]  of Medicine\[facility] at [facility] Practice Director,[facility] - Gastroenterology/Internal Medicine at [address]\[place], [place] [address] Tel: [phone]\[facility] Email: [email or web address] \[facility] \[facility] The attached note has been created using a voice recognition system (Dragon).  There may be some misspellings and typos.  Please call my office if you have any issues or questions.

## 2020-12-08 NOTE — ASSESSMENT
[FreeTextEntry1] : Patient with a history of adenomatous colonic polyps who is doing well after colonoscopy.  Her concern regarding worms in her stool appears to have been unfounded and she has not seen any subsequent cause for concern.\par \par We will repeat a colonoscopy in 3 years that is August 2023.\par \par \par Plan from 8/17/2020 - Patient with recent anal itching possible worms in her stool although I believe it is more likely vegetable matter.  The itching appears to be responding to hydrocortisone suppositories.  The patient has a history of adenomatous colonic polyps.\par \par The sample that the patient brought in was sent for O&P.  Additionally, we will send a fresh sample tomorrow for infectious PCR testing and O&P.\par \par The patient will schedule the previously recommended colonoscopy.\par \par \par Plan from 5/15/2020 - Patient with a history of colonic polyps.\par \par Once the COVID-19 pandemic allows, a colonoscopy will be scheduled. The risks, benefits, alternatives, and limitations of the procedure, including the possibility of missed lesions, were explained.  We will wait until the patient's radiation is over and hopefully perform the colonoscopy over the summer.\par \par \par Plan from 6/14/2019 - Patient with ongoing complaints of rectal pain and pressure which did not respond to necrosis of hydrocortisone suppositories. Rectal exam does not reveal any masses or obvious hemorrhoids and stool is guaiac-negative.\par \par Patient already has an appointment with the colorectal surgeon next week. I advised her to keep this as she may have a fissure or symptomatic internal hemorrhoids.\par \par Patient is due for colonoscopy in February 2020 for her history of colonic polyps.\par \par \par Plan from 5/10/19 - Patient with complaints of rectal pressure along with pain, burning, and itching leading to a sensation that her bowel movements are incomplete. There are no masses or external hemorrhoids on rectal examination stool is guaiac negative. It is possible that the patient has symptomatic internal hemorrhoids.\par \par We will empirically treat with hydrocortisone suppositories 25 mg twice a day for 10 days.\par \par The patient will call me after these 10 days to update me on her symptoms. If there is no improvement, I will then send the patient to colorectal surgeon for further evaluation.\par \par Patient is due for colonoscopy in February 2020.\par \par \par Plan from 2/14/18 - Patient's bowel movements a doing much better without the use of any medication. She is changed her diet and is exercising. The patient does complain of belching and a globus sensation related to her known reflux.\par \par Patient was advised to continue a high fiber diet.\par \par Patient was advised to use omeprazole 20 mg a day as needed.\par \par A list of dietary and lifestyle modifications in the treatment of GERD was given to the patient.\par \par \par Plan from 1/22/18 - Patient with improved bowel movements on Linzess but still with intermittent left lower quadrant pain. The patient has a rash and there is some concern that this could be an allergic reaction to the Linzess. Patient also states that her reflux symptoms have been worse since beginning the medication.\par \par Patient was advised to stop Linzess and returned to using MiraLax 17 g a day.\par \par The patient was given hyoscyamine SL2 use p.r.n.\par \par Patient was advised she must see a dermatologist regarding the rash.\par \par Patient was advised to continue diet and weight loss for her acid reflux.\par \par \par Plan from 1/5/18 - Patient doing somewhat better on Amitiza 24 mcg b.i.d. with improvement with her bowel movements. She continues to have left lower quadrant pain.\par \par I have changed the patient to Linzess 290 mcg q.d. to see if this provides better symptom response.\par \par Patient will return to see me in 2-3 weeks.\par \par \par Plan from 12/13/17 - Patient with continued complaints of constipation and incomplete bowel movements. She had a CT scan which showed a significant amount of stool extending into the small intestine. She has soft stool present in the rectal vault on rectal exam. She denies any reflux symptoms and was able to come off the omeprazole.\par \par I've increased Amitiza 20 mcg b.i.d. Samples were given.\par \par A list of dietary and lifestyle modifications in the treatment of GERD was given to the patient.\par \par Patient will return to see me in 3-4 weeks to assess her response.\par \par \par Note from 11/30/17 - Patient with ongoing feeling of constipation despite being on MiraLax daily. She also has left lower quadrant pain. CT scans have not shown any evidence of diverticulitis. The patient self administered a course of Cipro last week without improvement. Her reflux symptoms are doing very well despite decreasing her dosage of omeprazole significantly.\par \par As the symptoms are most consistent with IBS-C, I have given the patient samples of Amitiza 8 mcg b.i.d. to take instead of the MiraLax.\par \par Bloodwork was sent for CBC, chem-pack, TSH, celiac markers. \par \par Patient will try stopping omeprazole altogether.\par \par Patient will return to see me in 2 weeks to assess her response to the above.\par \par \par Plan from 11/7/17 - Patient doing well on daily MiraLax with resolution of abdominal pain and regular bowel movements. She has no symptoms of reflux on omeprazole.\par \par Patient will continue MiraLax daily.\par \par A list of dietary and lifestyle modifications in the treatment of GERD was given to the patient.\par \par We will try decreasing omeprazole to 20 mg every other day.\par \par \par Plan from 10/19/17 - Patient went to the ER with left lower quadrant pain and was found to have a significant amount  of stool in both the small bowel and colon. CT also showed moderate thickening versus undistended distention of the antrum. She feels better after taking magnesium citrate. She had colonoscopy and EGD earlier this year.\par \par Patient was advised to start MiraLax 17 g a day.\par \par UA and C&S were sent. Patient was also advised to followup with her urologist Dr. Salazar regarding the CT findings.\par \par EKG was done for the irregular heart beat. Frequent PACs were noted. She was advised to followup with Dr. Sheth regarding this.\par \par Patient will return to see me in 2 weeks.

## 2020-12-08 NOTE — HISTORY OF PRESENT ILLNESS
[FreeTextEntry1] : We reviewed the evaluations done since the patient's last visit on August 17, 2020.  At that time, the patient had concern that she had worms in her stool.  Stool testing by PCR and to O&P's were negative.  The patient has not seen any further "worms", which was likely vegetable matter or perhaps the gelatin of the patient's capsules.  The patient underwent colonoscopy on August 28, 2020 for history of adenomatous colonic polyps.  The examination was normal other than external hemorrhoids which are asymptomatic.  The patient denies abdominal pain.  She reports 1-2 bowel movements a day which are mostly formed.  She denies melena or bright red blood per rectum.  She denies heartburn or dysphagia.  The patient's weight is stable.\par \par \par Note from 8/17/2020 - The patient has a history of adenomatous colonic polyps.  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  The past 1-1/2 weeks, the patient has had anal itching.  She tried Desitin and then used hydrocortisone suppositories twice daily for 3 days with some improvement.  She is concerned because she feels that she may have seen worms in her stool.  She brought in a sample which could be vegetable matter but possibly could be a worm.  The patient has been having 1-2 solid bowel movements a day.  She notes mild lower abdominal cramping.\par \par \par Note from 5/15/2020 - The patient has a history of adenomatous colonic polyps.  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  She is to begin radiation treatment on Monday.  She feels well denying heartburn, dysphasia, abdominal pain.  She is no longer taking MiraLAX and is having 1 bowel movement a day.  She has seen blood in the past but not for several months.  She denies melena.  The patient is gained weight.  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  The patient has not been admitted to the hospital in the past year and denies any cardiac issues.\par \par \par Note from 6/14/2019 - The patient continues to have rectal pain and is noted no improvement after 10 days of hydrocortisone suppositories. She is on MiraLax daily and is moving her bowels once to twice a day. She denies rectal bleeding and is otherwise well.\par \par \par Note from 5/10/19 - The patient has a history of colonic polyps. Recently, she has a sensation of constipation with incomplete bowel movements she is moving her bowels one to 2 times a day. She has been taking MiraLax daily and Colace for greater than a month but still feels rectal pressure leading to a sensation of incomplete bowel movements. She also notes burning and itching in her rectum as well. She denies melena or prior blood per rectum. She denies abdominal pain. She has lost 18 pounds over the past 4 months intentionally. The patient has not been hospitalized in the past year and denies any cardiac issues.\par \par \par Note from 2/14/18 - The patient stopped Linzess and never went back on MiraLax. She has changed her diet and is eating more food more fruits and vegetables as well as exercising. She reports 1-2 solid bowel movements a day without melena or bright red blood per rectum. She notes much less abdominal pain. She tried hyoscyamine 2-3 times and did not notice any response. She does report that she is belching and also has a globus sensation in her throat. She has occasional mild heartburn. She denies dysphagia. The patient started taking apple side of the negative.\par \par \par Note from 1/22/18 - The patient has been on Linzess 290 mcg a day for the past 2 weeks. She is having one bowel movement a day which is soft without melena or prior blood per rectum. She continues to have left lower quadrant pain one to 2 times a week that last a few hours at a time and then resolves. Of concern, the patient has a macular rash on her face, chest, and back along with some pruritus. It is unclear whether or not this was present before the Linzess was started. The patient also states that her heartburn has returned after being placed on the Linzess. The patient has lost 6 pounds intentionally.\par \par \par Note from 1/5/18 - The patient has been on Amitiza 24 mcg b.i.d. Her bowel movements are better with 1-2 solid bowel movements a day. She continues to have left lower quadrant pain occurring 2-3 times a week. She also notes increased belching.\par \par \par Note from 12/13/17 - The patient has been on Amitiza 8 mcg b.i.d. for the past 10-12 days. She continues to complain of constipation with incomplete bowel movements. She goes mostly daily although occasionally skips a day. She has occasional left lower quadrant pain/spasm. The patient has stopped her omeprazole completely and denies heartburn, dysphagia, or abdominal pain. We reviewed her blood work from the previous visit on November 30. These results were normal.\par \par \par Note from 11/30/17 - The patient has been on MiraLax daily and has been able to decrease her omeprazole to 20 mg every 3-4 days. Despite moving her bowels once a day with a solid bowel movement, she continues to feel like she is full all and also has started to have left lower quadrant pain again. She took 7 days of Cipro 500 mg b.i.d. on her home last week but this did not help the pain. She also complains of chills but does not have fever, nausea, or vomiting. She denies melena or bright red blood per rectum. She also denies any heartburn or dysphagia on the infrequent dosing of omeprazole. Her weight is stable.\par \par \par Note from 11/7/17 - The patient has been on MiraLax q.d. and is having one to 2 solid bowel movements a day. She skipped a day on 2 occasions. Her left lower quadrant pain is resolved. Her UA and C&S was negative. She denies heartburn or dysphagia. Of note, the patient was just started on Neurontin.\par \par \par Note from 10/19/17 - The patient went to be [facility] emergency room on October 15, 2017 with left lower quadrant pain which had been going on for weeks. A CT scan showed fecalization of some small bowel loops and prominence of stool consistent with possible delayed transit. Additionally, minimal right hydronephrosis and left renal collecting system fullness was seen. There was mild wall thickening versus under distention of the gastric antrum. The patient took a 10 ounce bile and citrate of magnesia yesterday and emptied 4 times. She does feel better. She states that the pain is still there though much less and is more with sitting. There is improvement with a bowel movement. The patient reports one bowel movement a day without melena or bright red blood per rectum. She denies fever but no chills for a few weeks. Her weight is stable. She denies nausea, vomiting, heartburn, or dysphagia on omeprazole 20 mg a day. She denies dysuria or hematuria.\par \par The patient's last EGD and colonoscopy were in February. Colonoscopy had a polyp and external hemorrhoids. EGD had a 1 cm hiatal hernia.\par \par The patient has not been admitted to the hospital other than for an appendectomy in the past year. She denies any cardiac history.

## 2021-07-04 ENCOUNTER — TRANSCRIPTION ENCOUNTER (OUTPATIENT)
Age: 61
End: 2021-07-04

## 2022-04-11 PROBLEM — Z11.59 SCREENING FOR VIRAL DISEASE: Status: ACTIVE | Noted: 2020-08-17

## 2023-10-25 ENCOUNTER — APPOINTMENT (OUTPATIENT)
Dept: GASTROENTEROLOGY | Facility: CLINIC | Age: 63
End: 2023-10-25
Payer: COMMERCIAL

## 2023-10-25 VITALS
BODY MASS INDEX: 30.05 KG/M2 | WEIGHT: 176 LBS | OXYGEN SATURATION: 98 % | SYSTOLIC BLOOD PRESSURE: 120 MMHG | HEART RATE: 75 BPM | DIASTOLIC BLOOD PRESSURE: 80 MMHG | TEMPERATURE: 96.9 F | HEIGHT: 64 IN

## 2023-10-25 DIAGNOSIS — Z78.9 OTHER SPECIFIED HEALTH STATUS: ICD-10-CM

## 2023-10-25 DIAGNOSIS — Z86.010 PERSONAL HISTORY OF COLONIC POLYPS: ICD-10-CM

## 2023-10-25 PROCEDURE — 99213 OFFICE O/P EST LOW 20 MIN: CPT

## 2023-10-25 RX ORDER — SODIUM PICOSULFATE, MAGNESIUM OXIDE, AND ANHYDROUS CITRIC ACID 12; 3.5; 1 G/175ML; G/175ML; MG/175ML
10-3.5-12 MG-GM LIQUID ORAL
Qty: 2 | Refills: 0 | Status: ACTIVE | COMMUNITY
Start: 2023-10-25 | End: 1900-01-01

## 2024-01-12 ENCOUNTER — APPOINTMENT (OUTPATIENT)
Dept: GASTROENTEROLOGY | Facility: AMBULATORY MEDICAL SERVICES | Age: 64
End: 2024-01-12
Payer: COMMERCIAL

## 2024-01-12 PROCEDURE — 45380 COLONOSCOPY AND BIOPSY: CPT | Mod: 33

## 2024-01-29 ENCOUNTER — NON-APPOINTMENT (OUTPATIENT)
Age: 64
End: 2024-01-29